# Patient Record
Sex: FEMALE | Race: OTHER | HISPANIC OR LATINO | URBAN - METROPOLITAN AREA
[De-identification: names, ages, dates, MRNs, and addresses within clinical notes are randomized per-mention and may not be internally consistent; named-entity substitution may affect disease eponyms.]

---

## 2022-10-26 ENCOUNTER — EMERGENCY (EMERGENCY)
Facility: HOSPITAL | Age: 67
LOS: 1 days | Discharge: HOME | End: 2022-10-26
Admitting: STUDENT IN AN ORGANIZED HEALTH CARE EDUCATION/TRAINING PROGRAM
Payer: MEDICAID

## 2022-10-26 VITALS
HEIGHT: 63 IN | OXYGEN SATURATION: 97 % | RESPIRATION RATE: 16 BRPM | TEMPERATURE: 98 F | SYSTOLIC BLOOD PRESSURE: 166 MMHG | WEIGHT: 132.94 LBS | HEART RATE: 73 BPM | DIASTOLIC BLOOD PRESSURE: 88 MMHG
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
## 2022-10-26 DIAGNOSIS — S05.8X2A OTHER INJURIES OF LEFT EYE AND ORBIT, INITIAL ENCOUNTER: ICD-10-CM

## 2022-10-26 DIAGNOSIS — S00.12XA CONTUSION OF LEFT EYELID AND PERIOCULAR AREA, INITIAL ENCOUNTER: ICD-10-CM

## 2022-10-26 DIAGNOSIS — R51.9 HEADACHE, UNSPECIFIED: ICD-10-CM
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
## 2022-10-26 PROCEDURE — G1004: CPT

## 2022-10-26 PROCEDURE — 70486 CT MAXILLOFACIAL W/O DYE: CPT | Mod: MG

## 2022-10-26 PROCEDURE — 70450 CT HEAD/BRAIN W/O DYE: CPT | Mod: 26,MG

## 2022-10-26 PROCEDURE — 70450 CT HEAD/BRAIN W/O DYE: CPT | Mod: MG

## 2022-10-26 PROCEDURE — 99284 EMERGENCY DEPT VISIT MOD MDM: CPT

## 2022-10-26 PROCEDURE — 70486 CT MAXILLOFACIAL W/O DYE: CPT | Mod: 26,MG

## 2022-10-26 PROCEDURE — 99284 EMERGENCY DEPT VISIT MOD MDM: CPT | Mod: 25

## 2022-10-26 NOTE — ED ADULT NURSE NOTE - BREATHING, MLM
bilateral UE's + bilateral LE's( Left LE n/a 2/2 NWB in knee immobilizer) grossly assessed via functional assessment of sit to stand transfer, 3- to 3/5
Spontaneous, unlabored and symmetrical

## 2022-10-26 NOTE — ED ADULT TRIAGE NOTE - OTHER COMPLAINTS
patient reports mechanical fall today, +head strike--denies LOC/blood thinner use--denies cervical tenderness/bilat UE numbness/tingling

## 2022-10-26 NOTE — ED PROVIDER NOTE - NSFOLLOWUPINSTRUCTIONS_ED_ALL_ED_FT
Lesión en la jessica en los adultos    Head Injury, Adult       Hay muchos tipos de lesiones en la jessica. Algunas pueden ser tan leves lilibeth un chichón pequeño. Otras pueden ser más graves. Ejemplos de lesiones graves:  •Un golpe jason en la jessica que sacude el cerebro hacia angie y atrás (conmoción cerebral).      •Un moretón (contusión) en el cerebro. [place] significa que hay hemorragia en el cerebro que puede causar hinchazón.      •Fisura en el cráneo (fractura de cráneo).      •Hemorragia en el cerebro que se acumula, se espesa (se produce un coágulo) y forma un bulto (hematoma).      La mayoría de los problemas provocados por rodrigo lesión en la jessica ocurren jamey las primeras 24 horas. Sin embargo, es posible que siga teniendo efectos secundarios entre 7 y 10 días después de la lesión. Es importante controlar matthews afección para real si hay cambios. Es posible que deban observarlo en el departamento de emergencias o en el servicio de atención urgente, o puede ser necesario que se quede en el hospital.      ¿Cuáles son las causas?    Hay muchas causas posibles de rodrigo lesión en la jessica. Rodrigo lesión en la jessica puede tener estas causas:  •Un accidente automovilístico.      •Accidentes en bicicleta o motocicleta.      •Lesiones deportivas.      •Caídas.      •Ser golpeado por un objeto.        ¿Cuáles son los signos o síntomas?    Los síntomas de lesión en la jessica incluyen un moretón, un chichón o un sangrado en el lugar de la lesión. Otros síntomas físicos pueden ser:  •Dolor de jessica.      •Sensación de que va a vomitar (náuseas) o vomitar.      •Mareos.      •Visión borrosa o doble.      •Sentir incomodidad cerca de luces brillantes o ruidos joslyn.      •Temblores que no puede controlar (convulsiones).      •Cansancio.      •Dificultad para despertarse.      •Desmayos o pérdida de la conciencia.      Los síntomas mentales o emocionales pueden incluir los siguientes:  •Sentirse abrumado o malhumorado.      •Confusión y problemas de memoria.      •Tener problemas para prestar atención o concentrarse.      •Cambios en los hábitos de alimentación o en el sueño.      •Sentirse preocupado o nervioso (ansioso).      •Sentirse natasha (deprimido).        ¿Cómo se trata?    El tratamiento de esta afección depende de la gravedad de la lesión y del tipo de lesión que sufrió. El objetivo principal es prevenir problemas y darle tiempo al cerebro para que se recupere.    Lesión de jessica leve     Si usted sufre rodrigo lesión de jessica leve, es posible que lo envíen a casa, y el tratamiento puede incluir lo siguiente:  •Estar en observación. Un adulto responsable debe quedarse con usted jamey 24 horas después de producida la lesión y controlarlo con frecuencia.      •[place] físico.      •[place] cerebral.      •Analgésicos.      Lesión de jessica grave    Si tiene rodrigo lesión de jessica grave, el tratamiento puede incluir lo siguiente:  •Estar en observación cercana. [place] incluye permanecer en el hospital.    •Medicamentos para:  •Ayudar a aliviar el dolor.      •Evitar las convulsiones.      •Ayudar con la hinchazón del cerebro.        •Proteger las vías respiratorias y usar rodrigo máquina que lo ayude a respirar (respirador).      •Tratamientos para observar y tratar la hinchazón dentro del cerebro.    •Cirugía de cerebro. Esta puede ser necesaria en los siguientes casos:  •Extraer rodrigo acumulación de leah o coágulos de leah.      •Interrumpir el sangrado.      •Retirar rodrigo parte del cráneo. [place] permite que el cerebro tenga lugar para hincharse.          Siga estas instrucciones en matthews casa:    Actividad     •Neema reposo.      •Evite las actividades difíciles o cansadoras.      •Asegúrese de dormir lo suficiente.    •Deje que el cerebro descanse. Para hacerlo, limite las actividades que requieran pensar mucho o prestar mucha atención, lilibeth las siguientes:  •Mirar televisión.      •Jugar juegos de memoria y armar rompecabezas.      •Tareas para el hogar o trabajos relacionados con el empleo.      •Trabajar en la computadora, participar en redes sociales y enviar mensajes de texto.        •Evite las actividades que pudieran provocar otra lesión en la jessica hasta que el médico lo autorice. Entre ellas, practicar deportes. Puede ser peligroso tener otra lesión en la jessica antes de que se haya recuperado de la primera.      •Pregunte al médico cuándo puede regresar a las actividades normales sin riesgo, lilibeth al trabajo o a la escuela. Pida al médico un plan detallado para volver a realizar las actividades habituales de manera progresiva.      •Consulte a matthews médico cuándo puede conducir, andar en bicicleta o usar maquinaria pesada. No realice estas actividades si se siente mareado.        Estilo de soumya      • No jorge alcohol hasta que el médico lo autorice.      • No consuma drogas.      •Si le resulta más difícil que lo habitual recordar las cosas, escríbalas.      •Trate de hacer rodrigo cosa por vez si se distrae con facilidad.      •Consulte con familiares y amigos si debe antwan decisiones importantes.      •Cuénteles a reba amigos, familiares, colegas de confianza y matthews gerente en el trabajo sobre matthews lesión, los síntomas y reba límites (restricciones). Pídales que observen si aparecen nuevos problemas o empeoran los existentes.      Instrucciones generales     •[place] los medicamentos de venta shiva y los recetados solamente lilibeth se lo haya indicado el médico.      •Pídale a alguien que lo acompañe jamey 24 horas después de la lesión en la jessica. Esta persona debe observar si hay cambios en los síntomas y estar preparada para obtener ayuda.      •Concurra a todas las visitas de seguimiento lilibeth se lo haya indicado el médico. [place] es importante.      ¿Cómo se maryanne?     •Trabaje en matthews equilibrio y matthews fuerza. [place] puede ayudarlo a evitar caídas.      •Use el cinturón de seguridad cuando se encuentre en un vehículo en movimiento.    •Use un deisy cuando realice las siguientes actividades:  •Andar en bicicleta.      •Esquiar.      •Practicar algún otro deporte o actividad que representen un riesgo de lesión.      •Si brian alcohol:•Limite la cantidad que brian:  •De 0 a 1 medida por día para las mujeres que no estén embarazadas.      •De 0 a 2 medidas por día para los hombres.        •Esté atento a la cantidad de alcohol que hay en las bebidas que dee. En los [place], rodrigo medida equivale a rodrigo botella de cerveza de 12 oz (355 ml), un vaso de vino de 5 oz (148 ml) o un vaso de rodrigo bebida alcohólica de romain graduación de 1½ oz (44 ml).      •Neema de matthews hogar un lugar más seguro:  •Deshacerse de los obstáculos en pisos y escaleras. [place] incluye las cosas que pueden hacer que se tropiece.      •Coloque barras para sostén en los eileen y pasamanos en las escaleras.      •Ponga alfombras antideslizantes en pisos y bañeras.      •Mejore la iluminación de las zonas oscuras.          Dónde buscar más información    •Centers for Disease Control and Prevention (Centros para el Control y la Prevención de Enfermedades): www.cdc.gov        Solicite ayuda de inmediato si:  •Tiene lo siguiente:  •Dolor de jessica muy jason que no se calma con medicamentos.      •Dificultad para caminar o debilidad en los brazos o las piernas.      •Secreción transparente o con leah que proviene de la nariz o de los oídos.      •Cambios en la forma de real (visión).      •Rodrigo convulsión.      •Más confusión o está más gruñón.        •Reba síntomas empeoran.      •Está más somnoliento de lo normal o tiene dificultad para mantenerse despierto.      •Pierde el equilibrio.      •La parte central miah de los ojos (pupila) cambia de tamaño.      •Arrastra las palabras.      •[place] mareo empeora.      •Vomita.      Estos síntomas pueden indicar rodrigo emergencia. No espere a real si los síntomas desaparecen. Solicite atención médica de inmediato. Comuníquese con el servicio de emergencias de matthews localidad (911 en los [place]). No conduzca por reba propios medios hasta el hospital.       Resumen    •Las lesiones en la jessica pueden ser tan leves lilibeth un pequeño chichón. Otras pueden ser más graves.      •El tratamiento de esta afección depende de la gravedad de la lesión y del tipo de lesión que sufrió.      •[place]ídale a alguien que lo acompañe jamey 24 horas después de la lesión en la jessica.      •Pregunte al médico cuándo puede regresar a las actividades normales sin riesgo, lilibeth al trabajo o a la escuela.      •Para evitar rodrigo lesión en la jessica, use cinturón de seguridad cuando se desplace en automóvil, use deisy cuando monte en bicicleta, limite el consumo de alcohol y neema que matthews hogar sea más seguro.      Esta información no tiene lilibeth fin reemplazar el consejo del médico. Asegúrese de hacerle al médico cualquier pregunta que tenga.

## 2022-10-26 NOTE — ED PROVIDER NOTE - PATIENT PORTAL LINK FT
You can access the FollowMyHealth Patient Portal offered by [facility] by registering at the following website: [email or web address]. By joining [facility]’s FollowMyHealth portal, you will also be able to view your health information using other applications (apps) compatible with our system.

## 2022-10-26 NOTE — ED PROVIDER NOTE - CLINICAL SUMMARY MEDICAL DECISION MAKING FREE TEXT BOX
67 y/o f presents c/o trip and fall, hit her head and having mild headache and left periorbital pain/swelling; no LOC, no neuro deficits, no open wounds.  CT head and maxillofacial neg, will d/c, recommend ice to the face, tylenol PRN pain

## 2022-10-26 NOTE — ED PROVIDER NOTE - OBJECTIVE STATEMENT
67 y/o f presents s/p trip and fall, hit her face today.  Pt has swelling, bruising around her left eye with mild pain.  Denies LOC, neck/back pain, visual changes, weakness, dizziness, all other ROS negative.

## 2022-10-26 NOTE — ED ADULT NURSE NOTE - OBJECTIVE STATEMENT
pt is a 67 y/o F, presenting to ED today for c/o head injury. pt reports mechanical fall today, +head strike. denies LOC/blood thinner use. denies cervical tenderness, numbness, tingling, cough, fever, chills, n/v, cp or sob

## 2024-02-28 ENCOUNTER — EMERGENCY (EMERGENCY)
Facility: HOSPITAL | Age: 69
LOS: 1 days | Discharge: HOME | End: 2024-02-28
Attending: STUDENT IN AN ORGANIZED HEALTH CARE EDUCATION/TRAINING PROGRAM | Admitting: STUDENT IN AN ORGANIZED HEALTH CARE EDUCATION/TRAINING PROGRAM
Payer: MEDICAID

## 2024-02-28 VITALS
SYSTOLIC BLOOD PRESSURE: 153 MMHG | TEMPERATURE: 98 F | DIASTOLIC BLOOD PRESSURE: 93 MMHG | RESPIRATION RATE: 18 BRPM | HEART RATE: 82 BPM | OXYGEN SATURATION: 98 %

## 2024-02-28 VITALS
OXYGEN SATURATION: 100 % | DIASTOLIC BLOOD PRESSURE: 87 MMHG | TEMPERATURE: 98 F | SYSTOLIC BLOOD PRESSURE: 158 MMHG | HEART RATE: 75 BPM | WEIGHT: 134.92 LBS | RESPIRATION RATE: 16 BRPM

## 2024-02-28 DIAGNOSIS — S00.81XA ABRASION OF OTHER PART OF HEAD, INITIAL ENCOUNTER: ICD-10-CM
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
## 2024-02-28 DIAGNOSIS — S80.211A ABRASION, RIGHT KNEE, INITIAL ENCOUNTER: ICD-10-CM
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
## 2024-02-28 DIAGNOSIS — Z23 ENCOUNTER FOR IMMUNIZATION: ICD-10-CM

## 2024-02-28 DIAGNOSIS — I49.3 VENTRICULAR PREMATURE DEPOLARIZATION: ICD-10-CM

## 2024-02-28 PROCEDURE — 71045 X-RAY EXAM CHEST 1 VIEW: CPT

## 2024-02-28 PROCEDURE — 70486 CT MAXILLOFACIAL W/O DYE: CPT | Mod: MC

## 2024-02-28 PROCEDURE — 70450 CT HEAD/BRAIN W/O DYE: CPT | Mod: 26,MC

## 2024-02-28 PROCEDURE — 93005 ELECTROCARDIOGRAM TRACING: CPT

## 2024-02-28 PROCEDURE — 73562 X-RAY EXAM OF KNEE 3: CPT

## 2024-02-28 PROCEDURE — 70450 CT HEAD/BRAIN W/O DYE: CPT | Mod: MC

## 2024-02-28 PROCEDURE — 99285 EMERGENCY DEPT VISIT HI MDM: CPT

## 2024-02-28 PROCEDURE — 71045 X-RAY EXAM CHEST 1 VIEW: CPT | Mod: 26

## 2024-02-28 PROCEDURE — 73562 X-RAY EXAM OF KNEE 3: CPT | Mod: 26,RT

## 2024-02-28 PROCEDURE — 70486 CT MAXILLOFACIAL W/O DYE: CPT | Mod: 26,MC

## 2024-02-28 PROCEDURE — 90471 IMMUNIZATION ADMIN: CPT

## 2024-02-28 PROCEDURE — 93010 ELECTROCARDIOGRAM REPORT: CPT

## 2024-02-28 PROCEDURE — 99284 EMERGENCY DEPT VISIT MOD MDM: CPT | Mod: 25

## 2024-02-28 PROCEDURE — 90715 TDAP VACCINE 7 YRS/> IM: CPT

## 2024-02-28 RX ORDER — ACETAMINOPHEN 500 MG
650 TABLET ORAL ONCE
Refills: 0 | Status: COMPLETED | OUTPATIENT
Start: 2024-02-28 | End: 2024-02-28

## 2024-02-28 RX ORDER — TETANUS TOXOID, REDUCED DIPHTHERIA TOXOID AND ACELLULAR PERTUSSIS VACCINE, ADSORBED 5; 2.5; 8; 8; 2.5 [IU]/.5ML; [IU]/.5ML; UG/.5ML; UG/.5ML; UG/.5ML
0.5 SUSPENSION INTRAMUSCULAR ONCE
Refills: 0 | Status: COMPLETED | OUTPATIENT
Start: 2024-02-28 | End: 2024-02-28

## 2024-02-28 RX ADMIN — TETANUS TOXOID, REDUCED DIPHTHERIA TOXOID AND ACELLULAR PERTUSSIS VACCINE, ADSORBED 0.5 MILLILITER(S): 5; 2.5; 8; 8; 2.5 SUSPENSION INTRAMUSCULAR at 21:11

## 2024-02-28 RX ADMIN — Medication 650 MILLIGRAM(S): at 21:11

## 2024-02-28 NOTE — ED ADULT NURSE REASSESSMENT NOTE - NS ED NURSE REASSESS COMMENT FT1
Received report from day shift RN. Pt resting comfortably in stretcher. Resp even and unlabored. Updated pt on plan of care. Medicated per MAR. A&Ox4

## 2024-02-28 NOTE — ED PROVIDER NOTE - OBJECTIVE STATEMENT
68-year-old female with no reported past medical history presenting with facial and right knee abrasions status post mechanical fall.  Per daughter at bedside, patient tripped on uneven sidewalk earlier today, hit her face to the concrete ground outside, denies LOC or blood thinner use.  Initially had a transient moment of chest pain right after the fall but currently denies.  Sustained an abrasion to the right knee as well as over her nose and right cheek but otherwise denies neck pain, back pain, abdominal pain, vomiting, numbness, focal weakness, vision changes, difficulty walking.  Able to take the bus afterwards to go home. Unknown last TDAP.

## 2024-02-28 NOTE — ED PROVIDER NOTE - NSFOLLOWUPINSTRUCTIONS_ED_ALL_ED_FT
You were seen in the Emergency Department for: facial and knee abrasions after a fall    For pain, you may take Tylenol (acetaminophen) 650mg every 6 hours.    Please follow up with your primary physician. If you do not have a primary physician or specialist of your needs, please call [phone] to find one convenient for you. At this number you will be able to locate a provider who accepts your insurance, as well as locate the right specialist for your needs.    You should return to the Emergency Department if you feel any new/worsening/persistent symptoms including but not limited to: fever, chills, vomiting, chest pain, difficulty breathing, loss of consciousness, bleeding, uncontrolled pain, numbness/weakness of a body part

## 2024-02-28 NOTE — ED PROVIDER NOTE - PHYSICAL EXAMINATION
Gen - NAD; airway patent, follows commands; A+Ox3   HEENT - NCAT, EOMI, PERRL, no raccoon eyes or ledezma's sign, no septal hematoma, no rhinorrhea  Neck - supple, no c-spine tenderness, FROM  Resp - clear equal breath sounds b/l  CV -  RRR, no m/r/g  Abd - soft, NT, ND; no guarding or rebound  Back - no midline tenderness or stepoffs  MSK - FROM of b/l UE and LE, no gross deformities, soft compartments, NVI distally  Ext - 2+ distal pulses throughout  Neuro - full motor strength and sensation to LT throughout, GCS 15  Skin - warm, well perfused; +superficial abrasions over R maxillary cheek/nasal bridge/R patella Gen - NAD; airway patent, follows commands; A+Ox3   HEENT - NCAT, EOMI, PERRL, no raccoon eyes or ledezma's sign, no septal hematoma, no rhinorrhea  Neck - supple, no c-spine tenderness, FROM  Resp - clear equal breath sounds b/l  CV -  RRR, no m/r/g  Abd - soft, NT, ND; no guarding or rebound  Back - no midline tenderness or stepoffs  MSK - FROM of b/l UE and LE, no gross deformities, soft compartments, NVI distally; no tenderness/crepitus to chest anterior/lateral/posterior chest wall/rib cage  Ext - 2+ distal pulses throughout  Neuro - full motor strength and sensation to LT throughout, GCS 15  Skin - warm, well perfused; +superficial abrasions over R maxillary cheek/nasal bridge/R patella

## 2024-02-28 NOTE — ED ADULT NURSE NOTE - OBJECTIVE STATEMENT
68yF presents to the ER s/p mechanical trip and fall on a uneven sidewalk, (+) headstrike, (-) LOC, (-) AC use. Pt is complaining of right knee pain, and abrasion and pain to the face and nose. PT also stated that she had chest pain immediately post fall. Pt denies any headache, changes in sensation, current, CP/SOB, changes in vision, N/V/D.

## 2024-02-28 NOTE — ED ADULT TRIAGE NOTE - CHIEF COMPLAINT QUOTE
pt ambulatory s/p trip and fall on uneven sidewalk approx 1 hr prior to arrival. +head trauma no LOC denies blood thinner use. Reports right knee pain with abrasion noted as well as 5/10 chest pain since fall denies ob diff breathing. unknown last tetanus

## 2024-02-28 NOTE — ED ADULT NURSE NOTE - NSFALLRISKINTERV_ED_ALL_ED

## 2024-02-28 NOTE — ED PROVIDER NOTE - PATIENT PORTAL LINK FT
You can access the FollowMyHealth Patient Portal offered by [facility] by registering at the following website: [email or web address]. By joining [employer]’s FollowMyHealth portal, you will also be able to view your health information using other applications (apps) compatible with our system.

## 2024-02-28 NOTE — ED PROVIDER NOTE - ST/T WAVE
stable EKG, no REX nonspecific ST segment changes in II, AvF, v4-v6, isolated, no reciprocal changes

## 2024-02-28 NOTE — ED PROVIDER NOTE - CLINICAL SUMMARY MEDICAL DECISION MAKING FREE TEXT BOX
68-year-old female with no reported past medical history presenting with facial and right knee abrasions status post mechanical fall. 68-year-old female with no reported past medical history presenting with facial and right knee abrasions status post mechanical fall. Very well appearing despite abrasions to her R face, nasal bridge, and R patella. Vitals reassuring. Neurologically intact one exam with no spinal tenderness. FROM through all joints, NVI distally, well perfused throughout. Will obtain CT imaging to r/o ICH vs skull fx vs facial fx. XR chest to r/o ptx. XR knee to r/o acute fracture/dislocation. Low suspicion clinically for all of the above. EKG is sinus and without overt ischemic changes--does have some notable isolated ST segment depressions in precordial lateral and some inferior leads but no reciprocal changes or ST segment elevations. Patient has no ongoing chest pain or anginal equivalent at this time. I suspect that EKG findings are nonspecific and likely chronic in nature. Will plan to repeat EKG for stability in few hours.

## 2024-02-28 NOTE — ED PROVIDER NOTE - PROGRESS NOTE DETAILS
Ayo Murdock MD: Discussed negative XR on my wet read with patient/daughter. Pending CT-r. Also discussed EKG changes that I suspect are her more recent baseline and non specific/non contributory to today's visit. Patient continues to have no chest pain or anginal equivalent. Again confirms that her fall was completely mechanical in etiology and that she did not have any chest pain, weakness, or dizziness that contributed towards her fall. I recommended that patient and daughter monitor her symptoms and that she should return to the ER for repeat evaluation if she has return of chest pain, or has any sob/dizziness/diaphoresis/nausea. Will DC with negative CT read.